# Patient Record
Sex: MALE | Race: BLACK OR AFRICAN AMERICAN | NOT HISPANIC OR LATINO | URBAN - METROPOLITAN AREA
[De-identification: names, ages, dates, MRNs, and addresses within clinical notes are randomized per-mention and may not be internally consistent; named-entity substitution may affect disease eponyms.]

---

## 2018-02-05 ENCOUNTER — OUTPATIENT (OUTPATIENT)
Dept: OUTPATIENT SERVICES | Facility: HOSPITAL | Age: 83
LOS: 1 days | End: 2018-02-05
Payer: MEDICARE

## 2018-02-05 VITALS
TEMPERATURE: 98 F | SYSTOLIC BLOOD PRESSURE: 128 MMHG | OXYGEN SATURATION: 97 % | RESPIRATION RATE: 16 BRPM | HEIGHT: 69 IN | WEIGHT: 209 LBS | DIASTOLIC BLOOD PRESSURE: 71 MMHG | HEART RATE: 60 BPM

## 2018-02-05 DIAGNOSIS — K31.9 DISEASE OF STOMACH AND DUODENUM, UNSPECIFIED: ICD-10-CM

## 2018-02-05 DIAGNOSIS — Z98.890 OTHER SPECIFIED POSTPROCEDURAL STATES: Chronic | ICD-10-CM

## 2018-02-05 DIAGNOSIS — G47.33 OBSTRUCTIVE SLEEP APNEA (ADULT) (PEDIATRIC): ICD-10-CM

## 2018-02-05 DIAGNOSIS — E11.9 TYPE 2 DIABETES MELLITUS WITHOUT COMPLICATIONS: ICD-10-CM

## 2018-02-05 DIAGNOSIS — J44.9 CHRONIC OBSTRUCTIVE PULMONARY DISEASE, UNSPECIFIED: ICD-10-CM

## 2018-02-05 DIAGNOSIS — Z98.49 CATARACT EXTRACTION STATUS, UNSPECIFIED EYE: Chronic | ICD-10-CM

## 2018-02-05 DIAGNOSIS — D3A.029 BENIGN CARCINOID TUMOR OF THE LARGE INTESTINE, UNSPECIFIED PORTION: ICD-10-CM

## 2018-02-05 DIAGNOSIS — Z01.818 ENCOUNTER FOR OTHER PREPROCEDURAL EXAMINATION: ICD-10-CM

## 2018-02-05 LAB
ANION GAP SERPL CALC-SCNC: 15 MMOL/L — SIGNIFICANT CHANGE UP (ref 5–17)
BUN SERPL-MCNC: 17 MG/DL — SIGNIFICANT CHANGE UP (ref 7–23)
CALCIUM SERPL-MCNC: 9.1 MG/DL — SIGNIFICANT CHANGE UP (ref 8.4–10.5)
CHLORIDE SERPL-SCNC: 105 MMOL/L — SIGNIFICANT CHANGE UP (ref 96–108)
CO2 SERPL-SCNC: 22 MMOL/L — SIGNIFICANT CHANGE UP (ref 22–31)
CREAT SERPL-MCNC: 1.29 MG/DL — SIGNIFICANT CHANGE UP (ref 0.5–1.3)
GLUCOSE SERPL-MCNC: 172 MG/DL — HIGH (ref 70–99)
HBA1C BLD-MCNC: 6.7 % — HIGH (ref 4–5.6)
HCT VFR BLD CALC: 33.5 % — LOW (ref 39–50)
HGB BLD-MCNC: 10.7 G/DL — LOW (ref 13–17)
MCHC RBC-ENTMCNC: 28 PG — SIGNIFICANT CHANGE UP (ref 27–34)
MCHC RBC-ENTMCNC: 31.9 GM/DL — LOW (ref 32–36)
MCV RBC AUTO: 87.7 FL — SIGNIFICANT CHANGE UP (ref 80–100)
PLATELET # BLD AUTO: 163 K/UL — SIGNIFICANT CHANGE UP (ref 150–400)
POTASSIUM SERPL-MCNC: 4.1 MMOL/L — SIGNIFICANT CHANGE UP (ref 3.5–5.3)
POTASSIUM SERPL-SCNC: 4.1 MMOL/L — SIGNIFICANT CHANGE UP (ref 3.5–5.3)
RBC # BLD: 3.82 M/UL — LOW (ref 4.2–5.8)
RBC # FLD: 15.3 % — HIGH (ref 10.3–14.5)
SODIUM SERPL-SCNC: 142 MMOL/L — SIGNIFICANT CHANGE UP (ref 135–145)
WBC # BLD: 4.78 K/UL — SIGNIFICANT CHANGE UP (ref 3.8–10.5)
WBC # FLD AUTO: 4.78 K/UL — SIGNIFICANT CHANGE UP (ref 3.8–10.5)

## 2018-02-05 PROCEDURE — 83036 HEMOGLOBIN GLYCOSYLATED A1C: CPT

## 2018-02-05 PROCEDURE — 85027 COMPLETE CBC AUTOMATED: CPT

## 2018-02-05 PROCEDURE — 80048 BASIC METABOLIC PNL TOTAL CA: CPT

## 2018-02-05 PROCEDURE — G0463: CPT

## 2018-02-05 RX ORDER — MONTELUKAST 4 MG/1
1 TABLET, CHEWABLE ORAL
Qty: 0 | Refills: 0 | COMMUNITY

## 2018-02-05 NOTE — H&P PST ADULT - NEGATIVE RESPIRATORY AND THORAX SYMPTOMS
no dyspnea/no wheezing/no cough no cough/no pleuritic chest pain/no wheezing/no dyspnea no dyspnea/no pleuritic chest pain/no wheezing

## 2018-02-05 NOTE — H&P PST ADULT - HISTORY OF PRESENT ILLNESS
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     **** Patient with Left renal stones planned for left ESWL 2/7/18 at [facility], patient stopped ASA and Vitamins last week 1/31. 89 year old  with PMH of HTN, Asthma/COPD, DM2, kidney stones, found to have gastric tumor/mass planned for Upper EUS FNA anesthesia, cytology.     **** Patient with Left renal stones planned for left ESWL 2/7/18 at [facility], patient stopped ASA and Vitamins last week 1/31, patient and wife stated he will continue to hold ASA and vitmains for Upper EUS 89 year old  with PMH of HTN, Asthma/COPD, DM2, kidney stones, found to have gastric tumor/mass planned for Upper EUS FNA anesthesia, cytology.     **** Patient with Left renal stones planned for left ESWL 2/7/18 at [facility] [place], patient stopped ASA and Vitamins last week 1/31, patient and wife stated he will continue to hold ASA and vitmains for Upper EUS.

## 2018-02-05 NOTE — H&P PST ADULT - NEGATIVE CARDIOVASCULAR SYMPTOMS
no chest pain/no paroxysmal nocturnal dyspnea/no peripheral edema/no palpitations/no dyspnea on exertion

## 2018-02-05 NOTE — H&P PST ADULT - RS GEN PE MLT RESP DETAILS PC
airway patent/clear to auscultation bilaterally/breath sounds equal/good air movement/respirations non-labored

## 2018-02-05 NOTE — H&P PST ADULT - PROBLEM SELECTOR PLAN 1
planned for Upper EUS FNA anesthesia, cytology.   PST labs send  preprocedure instructions discussed  recent medical eval on file

## 2018-02-05 NOTE — H&P PST ADULT - NEGATIVE GASTROINTESTINAL SYMPTOMS
no change in bowel habits/no abdominal pain/no vomiting/no constipation/no melena/no hematochezia/no nausea no change in bowel habits/no vomiting/no abdominal pain/no melena/no hematochezia/no nausea

## 2018-02-05 NOTE — H&P PST ADULT - PSH
H/O hernia repair    History of cataract surgery, unspecified laterality  b/l  History of tonsillectomy    Status post evacuation of subdural hematoma  1986 H/O hernia repair  inguinal  History of cataract surgery, unspecified laterality  b/l  History of tonsillectomy    Status post evacuation of subdural hematoma  1986

## 2018-02-05 NOTE — H&P PST ADULT - NEGATIVE GENERAL GENITOURINARY SYMPTOMS
no gas in urine/no urinary hesitancy/no bladder infections/no flank pain R/no flank pain L/no dysuria

## 2018-02-05 NOTE — H&P PST ADULT - PMH
Essential hypertension    [place] (hard of hearing)  bilateral  Mild intermittent asthma without complication  denies any recent exacerbation/ denies hx of intubation hx  DAVID on CPAP    Type 2 diabetes mellitus without complication, unspecified long term insulin use status Chronic obstructive pulmonary disease, unspecified COPD type  denies any recent exacerb, condrolled on daily inhalers  Essential hypertension    Gastric tumor    [place] (hard of hearing)  bilateral hearing aids  Kidney stones  L>R, s/p ureteral stent 12/2017 planned for ESWL 2/7 at [facility]  Mild intermittent asthma without complication  denies any recent exacerbation/ denies hx of intubation hx  DAVID on CPAP    Type 2 diabetes mellitus without complication, unspecified long term insulin use status Chronic obstructive pulmonary disease, unspecified COPD type  denies any recent exacerb, controlled on daily inhalers  Essential hypertension    Gastric tumor    [place] (hard of hearing)  bilateral hearing aids  Kidney stones  L>R, s/p ureteral stent 12/2017 planned for ESWL 2/7 at [facility]  Mild intermittent asthma without complication  denies any recent exacerbation/ denies hx of intubation hx  DAVID on CPAP    Type 2 diabetes mellitus without complication, unspecified long term insulin use status Asthma  denies any recent exacerbation/ denies hx of intubation hx  Chronic obstructive pulmonary disease, unspecified COPD type  denies any recent exacerb, controlled on daily inhalers  Essential hypertension    Gastric tumor    [place] (hard of hearing)  bilateral hearing aids  Kidney stones  L>R, s/p ureteral stent 12/2017 planned for ESWL 2/7 at [facility]  DAVID on CPAP    Type 2 diabetes mellitus without complication, unspecified long term insulin use status

## 2018-03-06 ENCOUNTER — OUTPATIENT (OUTPATIENT)
Dept: OUTPATIENT SERVICES | Facility: HOSPITAL | Age: 83
LOS: 1 days | End: 2018-03-06
Payer: MEDICARE

## 2018-03-06 VITALS
OXYGEN SATURATION: 97 % | HEIGHT: 66 IN | SYSTOLIC BLOOD PRESSURE: 143 MMHG | DIASTOLIC BLOOD PRESSURE: 78 MMHG | HEART RATE: 52 BPM | WEIGHT: 217.38 LBS | RESPIRATION RATE: 18 BRPM | TEMPERATURE: 98 F

## 2018-03-06 DIAGNOSIS — Z98.890 OTHER SPECIFIED POSTPROCEDURAL STATES: Chronic | ICD-10-CM

## 2018-03-06 DIAGNOSIS — D3A.029 BENIGN CARCINOID TUMOR OF THE LARGE INTESTINE, UNSPECIFIED PORTION: ICD-10-CM

## 2018-03-06 DIAGNOSIS — Z98.49 CATARACT EXTRACTION STATUS, UNSPECIFIED EYE: Chronic | ICD-10-CM

## 2018-03-06 DIAGNOSIS — Z01.818 ENCOUNTER FOR OTHER PREPROCEDURAL EXAMINATION: ICD-10-CM

## 2018-03-06 DIAGNOSIS — N17.9 ACUTE KIDNEY FAILURE, UNSPECIFIED: ICD-10-CM

## 2018-03-06 LAB
ALBUMIN SERPL ELPH-MCNC: 3.9 G/DL — SIGNIFICANT CHANGE UP (ref 3.3–5)
ALP SERPL-CCNC: 68 U/L — SIGNIFICANT CHANGE UP (ref 40–120)
ALT FLD-CCNC: 12 U/L RC — SIGNIFICANT CHANGE UP (ref 10–45)
ANION GAP SERPL CALC-SCNC: 14 MMOL/L — SIGNIFICANT CHANGE UP (ref 5–17)
AST SERPL-CCNC: 13 U/L — SIGNIFICANT CHANGE UP (ref 10–40)
BILIRUB SERPL-MCNC: 0.4 MG/DL — SIGNIFICANT CHANGE UP (ref 0.2–1.2)
BUN SERPL-MCNC: 17 MG/DL — SIGNIFICANT CHANGE UP (ref 7–23)
CALCIUM SERPL-MCNC: 9.2 MG/DL — SIGNIFICANT CHANGE UP (ref 8.4–10.5)
CHLORIDE SERPL-SCNC: 104 MMOL/L — SIGNIFICANT CHANGE UP (ref 96–108)
CO2 SERPL-SCNC: 25 MMOL/L — SIGNIFICANT CHANGE UP (ref 22–31)
CREAT SERPL-MCNC: 1.17 MG/DL — SIGNIFICANT CHANGE UP (ref 0.5–1.3)
GLUCOSE SERPL-MCNC: 163 MG/DL — HIGH (ref 70–99)
HCT VFR BLD CALC: 32.7 % — LOW (ref 39–50)
HGB BLD-MCNC: 10.4 G/DL — LOW (ref 13–17)
MCHC RBC-ENTMCNC: 27.7 PG — SIGNIFICANT CHANGE UP (ref 27–34)
MCHC RBC-ENTMCNC: 31.8 GM/DL — LOW (ref 32–36)
MCV RBC AUTO: 87.2 FL — SIGNIFICANT CHANGE UP (ref 80–100)
PLATELET # BLD AUTO: 177 K/UL — SIGNIFICANT CHANGE UP (ref 150–400)
POTASSIUM SERPL-MCNC: 3.8 MMOL/L — SIGNIFICANT CHANGE UP (ref 3.5–5.3)
POTASSIUM SERPL-SCNC: 3.8 MMOL/L — SIGNIFICANT CHANGE UP (ref 3.5–5.3)
PROT SERPL-MCNC: 7.2 G/DL — SIGNIFICANT CHANGE UP (ref 6–8.3)
RBC # BLD: 3.75 M/UL — LOW (ref 4.2–5.8)
RBC # FLD: 16.1 % — HIGH (ref 10.3–14.5)
SODIUM SERPL-SCNC: 143 MMOL/L — SIGNIFICANT CHANGE UP (ref 135–145)
WBC # BLD: 3.81 K/UL — SIGNIFICANT CHANGE UP (ref 3.8–10.5)
WBC # FLD AUTO: 3.81 K/UL — SIGNIFICANT CHANGE UP (ref 3.8–10.5)

## 2018-03-06 PROCEDURE — 80053 COMPREHEN METABOLIC PANEL: CPT

## 2018-03-06 PROCEDURE — 85027 COMPLETE CBC AUTOMATED: CPT

## 2018-03-06 PROCEDURE — G0463: CPT

## 2018-03-06 RX ORDER — ASPIRIN/CALCIUM CARB/MAGNESIUM 324 MG
1 TABLET ORAL
Qty: 0 | Refills: 0 | COMMUNITY

## 2018-03-06 RX ORDER — AMLODIPINE BESYLATE 2.5 MG/1
1 TABLET ORAL
Qty: 0 | Refills: 0 | COMMUNITY

## 2018-03-06 RX ORDER — METOPROLOL TARTRATE 50 MG
1 TABLET ORAL
Qty: 0 | Refills: 0 | COMMUNITY

## 2018-03-06 RX ORDER — METFORMIN HYDROCHLORIDE 850 MG/1
0 TABLET ORAL
Qty: 0 | Refills: 0 | COMMUNITY

## 2018-03-06 RX ORDER — VALSARTAN 80 MG/1
1 TABLET ORAL
Qty: 0 | Refills: 0 | COMMUNITY

## 2018-03-06 RX ORDER — POTASSIUM CHLORIDE 20 MEQ
0 PACKET (EA) ORAL
Qty: 0 | Refills: 0 | COMMUNITY

## 2018-03-06 RX ORDER — IPRATROPIUM/ALBUTEROL SULFATE 18-103MCG
0 AEROSOL WITH ADAPTER (GRAM) INHALATION
Qty: 0 | Refills: 0 | COMMUNITY

## 2018-03-06 RX ORDER — ATORVASTATIN CALCIUM 80 MG/1
0.5 TABLET, FILM COATED ORAL
Qty: 0 | Refills: 0 | COMMUNITY

## 2018-03-06 RX ORDER — FORMOTEROL FUMARATE 12 MCG
2 CAPSULE, WITH INHALATION DEVICE INHALATION
Qty: 0 | Refills: 0 | COMMUNITY

## 2018-03-06 RX ORDER — BUDESONIDE, MICRONIZED 100 %
1 POWDER (GRAM) MISCELLANEOUS
Qty: 0 | Refills: 0 | COMMUNITY

## 2018-03-06 RX ORDER — MONTELUKAST 4 MG/1
1 TABLET, CHEWABLE ORAL
Qty: 0 | Refills: 0 | COMMUNITY

## 2018-03-06 RX ORDER — TAMSULOSIN HYDROCHLORIDE 0.4 MG/1
1 CAPSULE ORAL
Qty: 0 | Refills: 0 | COMMUNITY

## 2018-03-06 NOTE — H&P PST ADULT - PMH
ARF (acute renal failure)  recent with kidney stones  Asthma  denies any recent exacerbation/ denies hx of intubation hx  Chronic obstructive pulmonary disease, unspecified COPD type  denies any recent exacerb, controlled on daily inhalers  Essential hypertension    Gastric tumor    [place] (hard of hearing)  bilateral hearing aids  Kidney stones  L>R, s/p ureteral stent 12/2017 s/p ESWL 2/7 at [facility]  DAVID on CPAP    Type 2 diabetes mellitus without complication, unspecified long term insulin use status

## 2018-03-06 NOTE — H&P PST ADULT - PSH
H/O hernia repair  inguinal  H/O lithotripsy    History of cataract surgery, unspecified laterality  b/l  History of tonsillectomy    Status post evacuation of subdural hematoma  1986

## 2018-03-06 NOTE — H&P PST ADULT - OTHER CARE PROVIDERS
Dr. Goldberg Cardio , Dr. Carmen gan , Dr. Verde Pulm .  Dr norman nephrologist [phone] ( appt 3/8/18)

## 2018-03-06 NOTE — H&P PST ADULT - HISTORY OF PRESENT ILLNESS
89 year old  with PMH of HTN, Asthma/COPD, DM2, kidney stones, found to have gastric tumor/mass planned for Upper EUS FNA anesthesia, cytology.     **** Patient procedure postponed due to Left renal stones. s/p left ESWL 2/7/18 at [facility], patient stopped ASA and Vitamins last week 1/31, patient and wife stated he will continue to hold ASA and vitamins for Upper EUS.   pt with ARF secondary to renal stones, pt to see nephrologist 3/8/18 for evaluation.

## 2018-03-06 NOTE — H&P PST ADULT - PROBLEM SELECTOR PLAN 1
planned for Upper EUS FNA anesthesia, cytology.   PST labs done cbc, comp metabolic  preprocedure instructions discussed  recent medical eval on file

## 2018-03-12 ENCOUNTER — OUTPATIENT (OUTPATIENT)
Dept: OUTPATIENT SERVICES | Facility: HOSPITAL | Age: 83
LOS: 1 days | End: 2018-03-12
Payer: MEDICARE

## 2018-03-12 DIAGNOSIS — Z98.890 OTHER SPECIFIED POSTPROCEDURAL STATES: Chronic | ICD-10-CM

## 2018-03-12 DIAGNOSIS — Z98.49 CATARACT EXTRACTION STATUS, UNSPECIFIED EYE: Chronic | ICD-10-CM

## 2018-03-12 DIAGNOSIS — Z01.818 ENCOUNTER FOR OTHER PREPROCEDURAL EXAMINATION: ICD-10-CM

## 2018-03-12 DIAGNOSIS — D3A.029 BENIGN CARCINOID TUMOR OF THE LARGE INTESTINE, UNSPECIFIED PORTION: ICD-10-CM

## 2018-03-12 LAB — GLUCOSE BLDC GLUCOMTR-MCNC: 119 MG/DL — HIGH (ref 70–99)

## 2018-03-12 PROCEDURE — 88305 TISSUE EXAM BY PATHOLOGIST: CPT

## 2018-03-12 PROCEDURE — 88312 SPECIAL STAINS GROUP 1: CPT | Mod: 26

## 2018-03-12 PROCEDURE — 43242 EGD US FINE NEEDLE BX/ASPIR: CPT

## 2018-03-12 PROCEDURE — 82962 GLUCOSE BLOOD TEST: CPT

## 2018-03-12 PROCEDURE — 88305 TISSUE EXAM BY PATHOLOGIST: CPT | Mod: 26

## 2018-03-12 PROCEDURE — 88342 IMHCHEM/IMCYTCHM 1ST ANTB: CPT

## 2018-03-12 PROCEDURE — 88173 CYTOPATH EVAL FNA REPORT: CPT | Mod: 26

## 2018-03-12 PROCEDURE — 88172 CYTP DX EVAL FNA 1ST EA SITE: CPT

## 2018-03-12 PROCEDURE — 88341 IMHCHEM/IMCYTCHM EA ADD ANTB: CPT

## 2018-03-12 PROCEDURE — 88173 CYTOPATH EVAL FNA REPORT: CPT

## 2018-03-12 PROCEDURE — 88341 IMHCHEM/IMCYTCHM EA ADD ANTB: CPT | Mod: 26

## 2018-03-12 PROCEDURE — 88312 SPECIAL STAINS GROUP 1: CPT

## 2018-03-12 PROCEDURE — 88342 IMHCHEM/IMCYTCHM 1ST ANTB: CPT | Mod: 26

## 2018-03-13 LAB — NON-GYNECOLOGICAL CYTOLOGY STUDY: SIGNIFICANT CHANGE UP

## 2018-03-15 LAB — NON-GYNECOLOGICAL CYTOLOGY STUDY: SIGNIFICANT CHANGE UP

## 2018-03-17 LAB — SURGICAL PATHOLOGY STUDY: SIGNIFICANT CHANGE UP

## 2018-07-17 PROBLEM — H91.90 UNSPECIFIED HEARING LOSS, UNSPECIFIED EAR: Chronic | Status: ACTIVE | Noted: 2018-02-05

## 2018-07-17 PROBLEM — N20.0 CALCULUS OF KIDNEY: Chronic | Status: ACTIVE | Noted: 2018-02-05

## 2018-07-17 PROBLEM — J44.9 CHRONIC OBSTRUCTIVE PULMONARY DISEASE, UNSPECIFIED: Chronic | Status: ACTIVE | Noted: 2018-02-05

## 2022-05-03 NOTE — H&P PST ADULT - NEUROLOGICAL DETAILS
Request from pharmacy   4[facility]20/22 [facility]  Requesting rx for Shingrix instead  Order pending if ok alert and oriented x 3/no spontaneous movement